# Patient Record
Sex: MALE | Race: WHITE | Employment: UNEMPLOYED | URBAN - METROPOLITAN AREA
[De-identification: names, ages, dates, MRNs, and addresses within clinical notes are randomized per-mention and may not be internally consistent; named-entity substitution may affect disease eponyms.]

---

## 2021-12-30 VITALS
HEIGHT: 67 IN | WEIGHT: 208 LBS | DIASTOLIC BLOOD PRESSURE: 80 MMHG | OXYGEN SATURATION: 95 % | RESPIRATION RATE: 16 BRPM | BODY MASS INDEX: 32.65 KG/M2 | SYSTOLIC BLOOD PRESSURE: 121 MMHG | TEMPERATURE: 98 F | HEART RATE: 80 BPM

## 2021-12-30 PROCEDURE — 99283 EMERGENCY DEPT VISIT LOW MDM: CPT

## 2021-12-31 ENCOUNTER — HOSPITAL ENCOUNTER (EMERGENCY)
Facility: HOSPITAL | Age: 56
Discharge: HOME | End: 2021-12-31
Attending: EMERGENCY MEDICINE
Payer: COMMERCIAL

## 2021-12-31 DIAGNOSIS — Z20.822 SUSPECTED COVID-19 VIRUS INFECTION: Primary | ICD-10-CM

## 2021-12-31 NOTE — ED INITIAL ASSESSMENT (HPI)
C/o dehydration and fever of \"106\" at home x2 days. Took tylenol 30 min pta. No respiratory distress.

## 2021-12-31 NOTE — ED PROVIDER NOTES
Patient Seen in: [facility] Emergency Department    History   Patient presents with:  Covid      HPI    The patient presents to the ED complaining of dehydration and a fever of \"106\" at home for the past 2 days complains of associated body aches Conjunctiva/sclera: Conjunctivae normal.   Neck:      Trachea: No tracheal deviation. Cardiovascular:      Rate and Rhythm: Normal rate. Heart sounds: Normal heart sounds.    Pulmonary:      Effort: Pulmonary effort is normal. No respiratory distress Impression:  Suspected COVID-19 virus infection  (primary encounter diagnosis)    Disposition:  Discharge    Follow-up:  [facility] Emergency Department  [address]  [phone]    If symptoms worsen      Medicatio

## 2022-01-01 LAB — SARS-COV-2 RNA RESP QL NAA+PROBE: DETECTED

## 2023-11-29 ENCOUNTER — HOSPITAL ENCOUNTER (OUTPATIENT)
Age: 58
Discharge: HOME | End: 2023-11-29
Payer: COMMERCIAL

## 2023-11-29 VITALS
DIASTOLIC BLOOD PRESSURE: 89 MMHG | OXYGEN SATURATION: 97 % | HEART RATE: 85 BPM | RESPIRATION RATE: 20 BRPM | TEMPERATURE: 98 F | SYSTOLIC BLOOD PRESSURE: 152 MMHG

## 2023-11-29 DIAGNOSIS — J02.9 VIRAL PHARYNGITIS: Primary | ICD-10-CM

## 2023-11-29 LAB — S PYO AG THROAT QL: NEGATIVE

## 2023-11-29 PROCEDURE — 87880 STREP A ASSAY W/OPTIC: CPT | Performed by: NURSE PRACTITIONER

## 2023-11-29 PROCEDURE — 99213 OFFICE O/P EST LOW 20 MIN: CPT | Performed by: NURSE PRACTITIONER

## 2023-11-29 NOTE — DISCHARGE INSTRUCTIONS
Strep test is negative. Symptoms appear viral.  Recommend COVID testing. Continue over-the-counter remedies.   Follow-up with your primary doctor if no improvement

## 2024-02-19 ENCOUNTER — HOSPITAL ENCOUNTER (OUTPATIENT)
Age: 59
Discharge: HOME | End: 2024-02-19
Payer: COMMERCIAL

## 2024-02-19 ENCOUNTER — APPOINTMENT (OUTPATIENT)
Dept: GENERAL RADIOLOGY | Age: 59
End: 2024-02-19
Attending: NURSE PRACTITIONER
Payer: COMMERCIAL

## 2024-02-19 VITALS
OXYGEN SATURATION: 98 % | HEART RATE: 88 BPM | DIASTOLIC BLOOD PRESSURE: 68 MMHG | RESPIRATION RATE: 18 BRPM | SYSTOLIC BLOOD PRESSURE: 119 MMHG | TEMPERATURE: 98 F

## 2024-02-19 DIAGNOSIS — M70.22 OLECRANON BURSITIS OF LEFT ELBOW: Primary | ICD-10-CM

## 2024-02-19 PROCEDURE — 99213 OFFICE O/P EST LOW 20 MIN: CPT | Performed by: NURSE PRACTITIONER

## 2024-02-19 PROCEDURE — 73080 X-RAY EXAM OF ELBOW: CPT | Performed by: NURSE PRACTITIONER

## 2024-02-19 RX ORDER — IBUPROFEN 600 MG/1
600 TABLET ORAL EVERY 8 HOURS PRN
Qty: 30 TABLET | Refills: 0 | Status: SHIPPED | OUTPATIENT
Start: 2024-02-19 | End: 2024-02-26

## 2024-02-19 NOTE — DISCHARGE INSTRUCTIONS
Please ice the elbow.  15-20 minutes at a time every 1-2 hours.  Take Motrin for pain and swelling.  Please call orthopedics to schedule a follow-up appointment.

## 2024-02-19 NOTE — ED PROVIDER NOTES
Patient Seen in: [facility]      History     Chief Complaint   Patient presents with    Arm or Hand Injury     There is a fluid build up on my elbow. - Entered by patient     Stated Complaint: Arm or Hand Injury - There is a fluid build up on my elbow.    Subjective:   HPI    This is a well-appearing 58-year-old who presents with swelling and tenderness to the elbow which started this morning while working out.  States he does not recall any trauma.  States he worked out went home and noticed his elbow was swollen.  Mild tenderness.  No erythema or warmth.  No numbness.  No fever or chills.    Objective:   History reviewed. No pertinent past medical history.           History reviewed. No pertinent surgical history.             No pertinent social history.            Review of Systems   Musculoskeletal:  Positive for arthralgias.        Swelling to the L elbow   All other systems reviewed and are negative.      Positive for stated complaint: Arm or Hand Injury - There is a fluid build up on my elbow.  Other systems are as noted in HPI.  Constitutional and vital signs reviewed.      All other systems reviewed and negative except as noted above.    Physical Exam     ED Triage Vitals [02/19/24 1019]   /68   Pulse 88   Resp 18   Temp 97.6 °F (36.4 °C)   Temp src Temporal   SpO2 98 %   O2 Device None (Room air)       Current:/68   Pulse 88   Temp 97.6 °F (36.4 °C) (Temporal)   Resp 18   SpO2 98%         Physical Exam  Vitals and nursing note reviewed.   Constitutional:       General: He is awake. He is not in acute distress.     Appearance: Normal appearance. He is not ill-appearing, toxic-appearing or diaphoretic.   HENT:      Head: Normocephalic and atraumatic.      Right Ear: Tympanic membrane, ear canal and external ear normal.      Left Ear: Tympanic membrane, ear canal and external ear normal.      Nose: Nose normal.      Mouth/Throat:      Mouth: Mucous membranes are moist.       Pharynx: Oropharynx is clear. Uvula midline.   Eyes:      General: Lids are normal.      Extraocular Movements: Extraocular movements intact.      Conjunctiva/sclera: Conjunctivae normal.      Pupils: Pupils are equal, round, and reactive to light.   Cardiovascular:      Rate and Rhythm: Normal rate and regular rhythm.      Pulses: Normal pulses.      Heart sounds: Normal heart sounds.   Pulmonary:      Effort: Pulmonary effort is normal.      Breath sounds: Normal breath sounds.   Musculoskeletal:      Left elbow: Swelling present. Decreased range of motion. Tenderness present in olecranon process.      Comments: +swelling and tenderness to the L elbow. No erythema or warmth. +soft compartments, +radial pulse    Skin:     General: Skin is warm and dry.      Capillary Refill: Capillary refill takes less than 2 seconds.   Neurological:      General: No focal deficit present.      Mental Status: He is alert and oriented to person, place, and time.   Psychiatric:         Mood and Affect: Mood normal.         Behavior: Behavior normal. Behavior is cooperative.         Thought Content: Thought content normal.         Judgment: Judgment normal.       ED Course   Labs Reviewed - No data to display  X-ray and reevaluate.    X-ray viewed, no acute appearing fracture dislocation.  Minimal milligram station adjacent to the distal lateral humeral epicondyle may suggest sequela or previous epicondylitis.  Moderate soft tissue swelling overlying the open none.  XR ELBOW, COMPLETE (MIN 3 VIEWS), LEFT (CPT=73080)    Result Date: 2/19/2024  CONCLUSION:  1. No acute appearing fracture or dislocation.  No significant arthropathy. 2. Minimal mineralization/ossification adjacent to the distal lateral humeral epicondyle may suggest sequelae of previous epicondylitis/enthesopathy. 3. Moderate soft tissue swelling overlying the olecranon.  Correlate clinically for possible inflammatory process or olecranon bursitis.    Dictated by (CST):  Ruben Altman MD on 2/19/2024 at 10:56 AM     Finalized by (CST): Ruben Altman MD on 2/19/2024 at 10:58 AM          [facility]       Medical Decision Making  Patient is well-appearing on exam, nontoxic in appearance, exam as noted.  Differential diagnoses including but not limited to septic bursitis, gout, bursitis.  The elbow is not erythematous, warm.  No evidence of septic bursitis.  Full range of motion.  Patient has seen orthopedics in the past at [facility].  I have discussed the x-ray findings with him which does also demonstrate olecranon bursitis of the left elbow.  Discussed RICE, Ace wrap applied.  Extremity neurovascular intact at discharge.    Problems Addressed:  Olecranon bursitis of left elbow: acute illness or injury    Amount and/or Complexity of Data Reviewed  Radiology: ordered and independent interpretation performed. Decision-making details documented in ED Course.    Risk  Prescription drug management.        Disposition and Plan     Clinical Impression:  1. Olecranon bursitis of left elbow         Disposition:  Discharge  2/19/2024 11:04 am    Follow-up:  Primary Care Provider          Jose Luis Zhu Y, DO  [address] [facility] [address] [place] [id number]  [phone]                Medications Prescribed:  Discharge Medication List as of 2/19/2024 11:04 AM        START taking these medications    Details   ibuprofen 600 MG Oral Tab Take 1 tablet (600 mg total) by mouth every 8 (eight) hours as needed for Pain or Fever., Normal, Disp-30 tablet, R-0

## 2024-04-29 ENCOUNTER — TELEPHONE (OUTPATIENT)
Facility: CLINIC | Age: 59
End: 2024-04-29

## 2024-04-29 ENCOUNTER — OFFICE VISIT (OUTPATIENT)
Facility: CLINIC | Age: 59
End: 2024-04-29
Payer: COMMERCIAL

## 2024-04-29 VITALS
WEIGHT: 213 LBS | HEIGHT: 67 IN | BODY MASS INDEX: 33.43 KG/M2 | SYSTOLIC BLOOD PRESSURE: 136 MMHG | HEART RATE: 102 BPM | DIASTOLIC BLOOD PRESSURE: 82 MMHG

## 2024-04-29 DIAGNOSIS — Z12.11 SCREENING FOR COLORECTAL CANCER: ICD-10-CM

## 2024-04-29 DIAGNOSIS — Z12.12 SCREENING FOR COLORECTAL CANCER: ICD-10-CM

## 2024-04-29 DIAGNOSIS — Z86.010 HISTORY OF COLON POLYPS: Primary | ICD-10-CM

## 2024-04-29 DIAGNOSIS — Z86.010 PERSONAL HISTORY OF COLONIC POLYPS: ICD-10-CM

## 2024-04-29 DIAGNOSIS — Z12.11 COLON CANCER SCREENING: Primary | ICD-10-CM

## 2024-04-29 PROCEDURE — 99202 OFFICE O/P NEW SF 15 MIN: CPT | Performed by: INTERNAL MEDICINE

## 2024-04-29 NOTE — TELEPHONE ENCOUNTER
Scheduled for:  Colonoscopy [address]2885  Provider Name:  Dr. De La O  Date:  05/03/2024   Location:[facility]  Sedation:  [facility]  Time: 7:30am (pt is aware that [facility] will call the day before to confirm arrival time)    Prep:  Miralax Prep Instructions Given At The Office Visit.    Meds/Allergies Reconciled?:  Physician Reviewed   Diagnosis with codes: Colon Screening Z12.11/ Hx of Colon Polyps Z86.010  Was patient informed to call insurance with codes (Y/N):  Yes  Referral sent?:  Referral was sent at the time of electronic surgical scheduling.  [facility] or [facility] notified?:  I sent an electronic request to Endo Scheduling and received a confirmation today.  Medication Orders:  Pt is aware to NOT take iron pills, herbal meds and diet supplements for 7 days before exam. Also to NOT take any form of alcohol, recreational drugs and any forms of ED meds 24 hours before exam.   Misc Orders:       Further instructions given by staff:  I provide prep instructions to patient at the time of the appointment and reviewed date, time and location, he verbalized that he understood and is aware to call if he has any questions.    Patient was informed about the new cancellation policy for his/her procedure. Patient was also given a copy of the cancellation policy at the time of the appointment and verbalized understanding.

## 2024-04-29 NOTE — PATIENT INSTRUCTIONS
Schedule a colonoscopy for colorectal cancer screening and a personal history of colon polyps following a split dose MiraLAX/Gatorade preparation and monitored anesthesia care.

## 2024-04-29 NOTE — PROGRESS NOTES
Subjective:   Patient ID: Francisco Javier Barajas is a 58 year old male.    HPI  The patient presents today to discuss colorectal cancer screening/surveillance.  History is obtained from the patient and from the Care Everywhere feature of Epic (review limited due to the lack of [place] operative reports visible in Care Everywhere).    The patient underwent an initial screening colonoscopy in 2020.  He was found to have a 1.2 cm tubulovillous adenoma of the rectum that was endoscopically excised.  A follow-up colonoscopy in 2020 revealed a hyperplastic sigmoid colon polyp.  The patient was advised to have a surveillance colonoscopy with MAC in 3 years.    The patient states that he feels \"great\".  He works out on a daily basis.  His appetite is excellent.  He denies nausea, vomiting, dysphagia, odynophagia or heartburn.  He has had no abdominal pain.  His bowel movements are regular without recent change.  There is no history of melena or hematochezia.    Past medical history:  Negative    Medications:  None    Social history:  Rare cigar    Family history:  Father  of mesothelioma in his 50s  No family members with colorectal cancer          History/Other:   Review of Systems  See above    Wt Readings from Last 6 Encounters:   24 213 lb (96.6 kg)   21 208 lb (94.3 kg)     Body mass index is 33.36 kg/m².        No current outpatient medications on file.     Allergies:No Known Allergies    Objective:   Physical Exam  Vitals and nursing note reviewed.   Constitutional:       General: He is not in acute distress.     Appearance: He is well-developed. He is not ill-appearing, toxic-appearing or diaphoretic.   HENT:      Mouth/Throat:      Pharynx: No oropharyngeal exudate.   Eyes:      General: No scleral icterus.     Conjunctiva/sclera: Conjunctivae normal.   Neck:      Thyroid: No thyromegaly.   Cardiovascular:      Rate and Rhythm: Normal rate and regular rhythm.      Heart sounds: Normal heart  sounds. No murmur heard.  Pulmonary:      Effort: Pulmonary effort is normal. No respiratory distress.      Breath sounds: Normal breath sounds. No wheezing or rales.   Abdominal:      General: Bowel sounds are normal. There is no distension.      Palpations: Abdomen is soft. There is no mass.      Tenderness: There is no abdominal tenderness. There is no guarding or rebound.   Musculoskeletal:      Cervical back: Neck supple.   Lymphadenopathy:      Cervical: No cervical adenopathy.   Neurological:      Mental Status: He is alert and oriented to person, place, and time.   Psychiatric:         Behavior: Behavior normal.         Component  Ref Range & Units 7 d ago Comments   SODIUM  136 - 144 mmol/L 141    POTASSIUM  3.3 - 5.1 mmol/L 4.7    CHLORIDE  98 - 108 mmol/L 102    CO2  20 - 32 mmol/L 28    ANION GAP  4 - 16 11    BUN  7 - 22 MG/DL 20    CREATININE  0.6 - 1.4 MG/DL 1.17    GLUCOSE  70 - 100 MG/ High  IMPAIRED FASTING RANGE: 101-125 MG/DL   ALBUMIN  3.6 - 5.0 GM/DL 5.0    PROTEIN, TOTAL  6.5 - 8.3 GM/DL 7.7    CALCIUM  8.9 - 10.3 MG/DL 10.2    ALKALINE PHOSPHATASE  30 - 110 U/L 50    ALT (SGPT)  10 - 40 U/L 16    AST (SGOT)  15 - 45 U/L 18    BILIRUBIN,TOTAL  0.2 - 1.4 MG/DL 1.2             Specimen: Blood  Component  Ref Range & Units 7 d ago   WBC  3.5 - 10.5 K/UL 10.0   RBC  4.20 - 5.80 M/UL 5.41   HGB  13.0 - 17.5 GM/DL 16.0   HCT  38.0 - 54.0 % 46.3   MCV  82.0 - 99.0 FL 85.6   MCH  27.0 - 34.0 PG 29.6   MCHC  32.0 - 36.0 GM/DL 34.6   RDW  11.0 - 15.0 % 12.2   PLT CNT  150 - 400 K/   MPV  9.0 - 13.0 FL 10.2   Resulting Agency [facility]   Specimen Collected: 04/22/24  2:05 PM    Performed by: [facility] Last Resulted: 04/22/24  5:28 PM   Received From: [facility]  Result Received: 04/29/24  2:48 PM       Assessment & Plan:   1. History of colon polyps    2. Screening for colorectal cancer    The patient has a history of adenomatous colon  polyps including an advanced adenoma (based on size and villous component) removed in June 2020.  A surveillance colonoscopy in December 2020 revealed no signs of polyp recurrence.  A 3-year surveillance examination was advised.  The patient is currently asymptomatic.  As per his previous gastroenterologist's recommendations, I am recommending a colonoscopy at this time.  The rationale for this procedure, its nature and risks including bleeding and perforation requiring surgery was discussed.  The procedure will be arranged following a split dose MiraLAX/Gatorade preparation and monitored anesthesia care.        Meds This Visit:  Requested Prescriptions      No prescriptions requested or ordered in this encounter       Imaging & Referrals:  None

## 2024-05-02 ENCOUNTER — TELEPHONE (OUTPATIENT)
Facility: CLINIC | Age: 59
End: 2024-05-02

## 2024-05-02 NOTE — TELEPHONE ENCOUNTER
Patient is scheduled for a Colonoscopy tomorrow.  He wants to know if it is Ok to take tylenol for a headache.  Please call

## 2024-05-02 NOTE — TELEPHONE ENCOUNTER
Patient advised it is okay to take Tylenol per office protocol.  No further questions at this time.

## 2024-05-05 ENCOUNTER — APPOINTMENT (OUTPATIENT)
Dept: GENERAL RADIOLOGY | Age: 59
End: 2024-05-05
Attending: NURSE PRACTITIONER
Payer: COMMERCIAL

## 2024-05-05 ENCOUNTER — HOSPITAL ENCOUNTER (OUTPATIENT)
Age: 59
Discharge: HOME | End: 2024-05-05
Payer: COMMERCIAL

## 2024-05-05 VITALS
OXYGEN SATURATION: 99 % | SYSTOLIC BLOOD PRESSURE: 140 MMHG | RESPIRATION RATE: 22 BRPM | TEMPERATURE: 98 F | HEART RATE: 77 BPM | DIASTOLIC BLOOD PRESSURE: 89 MMHG

## 2024-05-05 DIAGNOSIS — R05.1 ACUTE COUGH: Primary | ICD-10-CM

## 2024-05-05 LAB
POCT INFLUENZA A: NEGATIVE
POCT INFLUENZA B: NEGATIVE
SARS-COV-2 RNA RESP QL NAA+PROBE: NOT DETECTED

## 2024-05-05 PROCEDURE — 71046 X-RAY EXAM CHEST 2 VIEWS: CPT | Performed by: NURSE PRACTITIONER

## 2024-05-05 PROCEDURE — 99213 OFFICE O/P EST LOW 20 MIN: CPT | Performed by: NURSE PRACTITIONER

## 2024-05-05 PROCEDURE — 87502 INFLUENZA DNA AMP PROBE: CPT | Performed by: NURSE PRACTITIONER

## 2024-05-05 PROCEDURE — U0002 COVID-19 LAB TEST NON-CDC: HCPCS | Performed by: NURSE PRACTITIONER

## 2024-05-05 RX ORDER — CODEINE PHOSPHATE AND GUAIFENESIN 10; 100 MG/5ML; MG/5ML
5 SOLUTION ORAL EVERY 6 HOURS PRN
Qty: 118 ML | Refills: 0 | Status: SHIPPED | OUTPATIENT
Start: 2024-05-05 | End: 2024-05-12

## 2024-05-05 RX ORDER — BENZONATATE 100 MG/1
100 CAPSULE ORAL 3 TIMES DAILY PRN
Qty: 30 CAPSULE | Refills: 0 | Status: SHIPPED | OUTPATIENT
Start: 2024-05-05 | End: 2024-06-04

## 2024-05-05 NOTE — ED PROVIDER NOTES
Patient Seen in: [facility]      History     Chief Complaint   Patient presents with    Cough/URI     Stated Complaint: Cough    Subjective:   HPI    This is a well-appearing 58-year-old male who presents with cough and nasal congestion over the last few days.  Denies any shortness of breath or chest pain.  No fever.  States he has difficulty sleeping he is coughing so much.  Not a smoker.  History of pneumonia in the past.    Objective:   No pertinent past medical history.            No pertinent past surgical history.              No pertinent social history.            Review of Systems   HENT:  Positive for congestion.    Respiratory:  Positive for cough.    All other systems reviewed and are negative.      Positive for stated complaint: Cough  Other systems are as noted in HPI.  Constitutional and vital signs reviewed.      All other systems reviewed and negative except as noted above.    Physical Exam     ED Triage Vitals [05/05/24 0807]   /89   Pulse 77   Resp 22   Temp 98.2 °F (36.8 °C)   Temp src Oral   SpO2 99 %   O2 Device None (Room air)       Current:/89   Pulse 77   Temp 98.2 °F (36.8 °C) (Oral)   Resp 22   SpO2 99%         Physical Exam  Vitals and nursing note reviewed.   Constitutional:       General: He is awake. He is not in acute distress.     Appearance: Normal appearance. He is not ill-appearing, toxic-appearing or diaphoretic.   HENT:      Head: Normocephalic and atraumatic.      Right Ear: Tympanic membrane, ear canal and external ear normal.      Left Ear: Tympanic membrane, ear canal and external ear normal.      Nose: Nose normal.      Mouth/Throat:      Mouth: Mucous membranes are moist.      Pharynx: Oropharynx is clear. Uvula midline.   Eyes:      General: Lids are normal.      Extraocular Movements: Extraocular movements intact.      Conjunctiva/sclera: Conjunctivae normal.      Pupils: Pupils are equal, round, and reactive to light.   Cardiovascular:       Rate and Rhythm: Normal rate and regular rhythm.      Pulses: Normal pulses.      Heart sounds: Normal heart sounds.   Pulmonary:      Effort: Pulmonary effort is normal.      Breath sounds: Normal breath sounds and air entry. No stridor, decreased air movement or transmitted upper airway sounds.   Skin:     General: Skin is warm and dry.      Capillary Refill: Capillary refill takes less than 2 seconds.   Neurological:      General: No focal deficit present.      Mental Status: He is alert and oriented to person, place, and time.   Psychiatric:         Mood and Affect: Mood normal.         Behavior: Behavior normal. Behavior is cooperative.         Thought Content: Thought content normal.         Judgment: Judgment normal.       ED Course     Labs Reviewed   POCT FLU TEST - Normal    Narrative:     This assay is a rapid molecular in vitro test utilizing nucleic acid amplification of influenza A and B viral RNA.   RAPID SARS-COV-2 BY PCR - Normal   Flu negative, COVID-negative.  Chest x-ray ordered.   No evidence of acute cardiopulmonary abnormality.    XR CHEST PA + LAT CHEST (CPT=71046)    Result Date: 5/5/2024  CONCLUSION: No radiographic evidence of acute cardiopulmonary abnormality.    [facility]    Dictated by (CST): Joselo Bradford MD on 5/05/2024 at 8:30 AM     Finalized by (CST): Joselo Bradford MD on 5/05/2024 at 8:31 AM          [facility]       Medical Decision Making  Patient is well-appearing exam, nontoxic appearance, exam as noted above.  Differential diagnoses include but limited to influenza, COVID-19, pneumonia.  Examination and symptoms consistent with viral URI.  Influenza negative, COVID-negative and chest x-ray unremarkable.  Patient antibiotics, not tachycardic, in no distress.  Will give Tessalon Perles during the day and have him take with medicine with codeine at night which she has tolerated well in the past.  Strict ER precautions given.  No clear negation for antibiotics at this time.  Patient  verbalized plan of care and states understanding.    Problems Addressed:  Acute cough: acute illness or injury    Amount and/or Complexity of Data Reviewed  Radiology: ordered and independent interpretation performed. Decision-making details documented in ED Course.  ECG/medicine tests: ordered and independent interpretation performed. Decision-making details documented in ED Course.    Risk  OTC drugs.  Prescription drug management.        Disposition and Plan     Clinical Impression:  1. Acute cough         Disposition:  Discharge  5/5/2024  8:38 am    Follow-up:  Primary Care Provider                Medications Prescribed:  Discharge Medication List as of 5/5/2024  8:40 AM        START taking these medications    Details   benzonatate 100 MG Oral Cap Take 1 capsule (100 mg total) by mouth 3 (three) times daily as needed for cough., Normal, Disp-30 capsule, R-0      guaiFENesin-codeine 100-10 MG/5ML Oral Solution Take 5 mL by mouth every 6 (six) hours as needed for cough., Normal, Disp-118 mL, R-0

## 2024-05-05 NOTE — DISCHARGE INSTRUCTIONS
Please take medication as prescribed.  Close follow-up with your primary care provider as recommended.  Any worsening symptoms please go to the ER.

## 2024-05-13 ENCOUNTER — TELEPHONE (OUTPATIENT)
Facility: CLINIC | Age: 59
End: 2024-05-13

## 2024-05-13 NOTE — TELEPHONE ENCOUNTER
Recall colonoscopy in 5 years per Dr De La O.    Colon done 05/03/2024    Health maintenance updated and message sent to patient outreach to repeat colonoscopy in 5 years

## 2024-05-13 NOTE — TELEPHONE ENCOUNTER
----- Message from Panchito De La O sent at 5/11/2024 12:38 PM CDT -----  I spoke to Prieto.  He is feeling well.  He had a solitary subcentimeter tubular adenoma removed.  Based on previous history of an advanced adenoma, I have recommended a surveillance colonoscopy in 5 years.    GI RNs: Please enter colonoscopy recall for 5 years.

## 2025-04-27 ENCOUNTER — APPOINTMENT (OUTPATIENT)
Dept: GENERAL RADIOLOGY | Age: 60
End: 2025-04-27
Attending: Physician Assistant
Payer: COMMERCIAL

## 2025-04-27 ENCOUNTER — HOSPITAL ENCOUNTER (OUTPATIENT)
Age: 60
Discharge: HOME | End: 2025-04-27
Payer: COMMERCIAL

## 2025-04-27 VITALS
TEMPERATURE: 99 F | OXYGEN SATURATION: 100 % | HEART RATE: 90 BPM | RESPIRATION RATE: 20 BRPM | SYSTOLIC BLOOD PRESSURE: 132 MMHG | DIASTOLIC BLOOD PRESSURE: 90 MMHG

## 2025-04-27 DIAGNOSIS — L03.116 CELLULITIS OF LEFT FOOT: Primary | ICD-10-CM

## 2025-04-27 PROCEDURE — 99213 OFFICE O/P EST LOW 20 MIN: CPT | Performed by: PHYSICIAN ASSISTANT

## 2025-04-27 PROCEDURE — 73630 X-RAY EXAM OF FOOT: CPT | Performed by: PHYSICIAN ASSISTANT

## 2025-04-27 RX ORDER — HYDROCODONE BITARTRATE AND ACETAMINOPHEN 5; 325 MG/1; MG/1
1-2 TABLET ORAL EVERY 6 HOURS PRN
Qty: 10 TABLET | Refills: 0 | Status: SHIPPED | OUTPATIENT
Start: 2025-04-27

## 2025-04-27 RX ORDER — CEFADROXIL 500 MG/1
500 CAPSULE ORAL 2 TIMES DAILY
Qty: 14 CAPSULE | Refills: 0 | Status: SHIPPED | OUTPATIENT
Start: 2025-04-27 | End: 2025-05-04

## 2025-04-27 NOTE — ED INITIAL ASSESSMENT (HPI)
Patient reports left lateral foot pain, redness, tenderness since yesterday am.  Skin is warm to the touch.  Denies fevers, chills.

## 2025-04-27 NOTE — ED PROVIDER NOTES
Chief Complaint   Patient presents with    Foot Pain       History obtained from: patient   services not used    HPI:     Francisco Javier Barajas is a 59 year old male who presents with atraumatic left foot pain and swelling since yesterday. Patient endorses redness and warmth of skin of foot. Patient states pain is worse with ambulating. Patient taking Advil and Aleve yesterday, overnight, and this morning. Denies injury/trauma, wound, bleeding, drainage, from skin, numbness, weakness, ankle pain, calf pain or swelling. Denies hx of blood clots.     PMH  Past Medical History[1]    PFSH    PFS asessment screens reviewed and agree.  Nurses notes reviewed I agree with documentation.    Family History[2]  Family history reviewed with patient/caregiver and is not pertinent to presenting problem.  Social History     Socioeconomic History    Marital status:      Spouse name: Not on file    Number of children: Not on file    Years of education: Not on file    Highest education level: Not on file   Occupational History    Not on file   Tobacco Use    Smoking status: Unknown    Smokeless tobacco: Not on file   Substance and Sexual Activity    Alcohol use: Not Currently    Drug use: Never    Sexual activity: Not on file   Other Topics Concern    Not on file   Social History Narrative    Not on file     Social Drivers of Health     Food Insecurity: No Food Insecurity (4/15/2024)    Received from [facility]    Hunger Vital Sign     Worried About Running Out of Food in the Last Year: Never true     Ran Out of Food in the Last Year: Never true   Transportation Needs: No Transportation Needs (4/15/2024)    Received from [facility]    PRAPARE - Transportation     Lack of Transportation (Medical): No     Lack of Transportation (Non-Medical): No   Housing Stability: Low Risk  (4/15/2024)    Received from [facility]    Housing Stability Vital Sign     Unable  to Pay for Housing in the Last Year: No     Number of Places Lived in the Last Year: 1     Unstable Housing in the Last Year: No         ROS:   Positive for stated complaint: left foot pain and swelling   Other systems are as noted in HPI.   All other systems reviewed and negative except as noted above.    Physical Exam:   Vital signs and nursing note reviewed.       /90   Pulse 90   Temp 98.9 °F (37.2 °C) (Oral)   Resp 20   SpO2 100%     GENERAL: well developed, no acute distress, non-toxic appearing   SKIN: good skin turgor, no obvious rashes  HEAD: normocephalic, atraumatic  EYES: sclera non-icteric bilaterally, conjunctiva clear bilaterally  OROPHARYNX: MMM, maintaining airway and secretions  NECK: no nuchal rigidity, no trismus, no edema, phonation normal    CARDIO: regular rate, DP pulse 2+ bilaterally, cap refill < 2 sec   LUNGS: no increased WOB  EXTREMITIES: tenderness to dorsal lateral left foot, skin intact with overlying warmth and erythema localized to dorsal lateral left foot, FROM, compartments soft, CMS intact  NEURO: no focal deficits  PSYCH: alert and oriented x3, answering questions appropriately, mood appropriate    MDM/Assessment/Plan:   Orders for this encounter:    Orders Placed This Encounter    XR FOOT, COMPLETE (MIN 3 VIEWS), LEFT (CPT=73630)     What is the Relevant Clinical Indication / Reason for Exam?:   atraumatic lateral left foot swelling and pain    cefadroxil 500 MG Oral Cap     Sig: Take 1 capsule (500 mg total) by mouth 2 (two) times daily for 7 days.     Dispense:  14 capsule     Refill:  0    HYDROcodone-acetaminophen 5-325 MG Oral Tab     Sig: Take 1-2 tablets by mouth every 6 (six) hours as needed for Pain.     Dispense:  10 tablet     Refill:  0       Labs performed this visit:  No results found for this or any previous visit (from the past 10 hours).    Imaging performed this visit:  XR FOOT, COMPLETE (MIN 3 VIEWS), LEFT (CPT=73630)   Final Result   PROCEDURE: XR  FOOT, COMPLETE (MIN 3 VIEWS), LEFT (CPT=73630)       COMPARISON: [facility], X FOOT LT, 3/11/2005, 12:47 PM.       INDICATIONS: Left lateral foot swelling and pain x 2 days. No injury.       TECHNIQUE: Three views were obtained.         FINDINGS:                    =====   CONCLUSION:        Small bunionette with soft tissue swelling adjacent to the 5th MTP joint.       No acute fracture or dislocation.       Fragmented posterior calcaneal enthesophyte with posterior soft tissue    swelling consistent with acute on chronic Achilles tendinosis.       Mild tibiotalar osteoarthritis.                 Dictated by (CST): Balta Bhakta MD on 4/27/2025 at 8:56 AM        Finalized by (CST): Balta Bhakta MD on 4/27/2025 at 8:57 AM                   Medical Decision Making  DDx includes cellulitis versus erysipelas versus gout versus arthritis versus other.  Patient is overall well-appearing with stable vitals presenting with pain and swelling to lateral left foot since yesterday.  No history of injury or trauma.  Skin intact, no wounds.  No signs of neurovascular compromise or compartment syndrome.    X-ray of left foot independently reviewed, soft tissue swelling adjacent to the fifth MTP joint noted, no acute osseous abnormality, acute on chronic Achilles tendinosis and mild tibiotalar osteoarthritis noted.  Discussed results and possible etiologies of symptoms with patient.  Given that pain and swelling is localized to foot and patient has no risk factors for blood clot, low suspicion for DVT however offered ultrasound to rule out DVT.  Patient declines ultrasound at this time.  Will treat for suspected cellulitis with oral antibiotics.  Will also prescribe short course of Norco for breakthrough pain, discussed medication safety and side effects.  Discussed supportive care including rest, ice, elevation, and OTC Advil or Aleve for pain.  Instructed patient to go directly to nearest ER with any worsening or  concerning symptoms.  Follow-up with PCP within 2 to 3 days.     Amount and/or Complexity of Data Reviewed  Radiology: ordered and independent interpretation performed.    Risk  OTC drugs.  Prescription drug management.          Diagnosis:    ICD-10-CM    1. Cellulitis of left foot  L03.116 cefadroxil 500 MG Oral Cap     HYDROcodone-acetaminophen 5-325 MG Oral Tab          All results reviewed and discussed with patient/patient's family. Patient/patient's family verbalize excellent understanding of instructions and feels comfortable with plan. All of patient's/patient's family's questions were addressed.   See AVS for detailed discharge instructions for your condition today.    Follow Up with:  Franklin Garcia, G[facility]SELLE  [address] [place] [address]  [phone]    Schedule an appointment as soon as possible for a visit         Note: This document was dictated using Dragon medical dictation software.  Proofreading was performed to the best of my ability, but errors may be present.    Dimple Lopez PA-C         [1] History reviewed. No pertinent past medical history.  [2] No family history on file.